# Patient Record
Sex: FEMALE | Race: WHITE
[De-identification: names, ages, dates, MRNs, and addresses within clinical notes are randomized per-mention and may not be internally consistent; named-entity substitution may affect disease eponyms.]

---

## 2019-02-02 ENCOUNTER — HOSPITAL ENCOUNTER (EMERGENCY)
Dept: HOSPITAL 12 - ER | Age: 69
Discharge: HOME | End: 2019-02-02
Payer: SELF-PAY

## 2019-02-02 VITALS — BODY MASS INDEX: 33.97 KG/M2 | HEIGHT: 64 IN | WEIGHT: 199 LBS

## 2019-02-02 VITALS — DIASTOLIC BLOOD PRESSURE: 82 MMHG | SYSTOLIC BLOOD PRESSURE: 147 MMHG

## 2019-02-02 DIAGNOSIS — Z79.2: ICD-10-CM

## 2019-02-02 DIAGNOSIS — J06.9: Primary | ICD-10-CM

## 2019-02-02 DIAGNOSIS — Z79.899: ICD-10-CM

## 2019-02-02 DIAGNOSIS — E11.9: ICD-10-CM

## 2019-02-02 DIAGNOSIS — I10: ICD-10-CM

## 2019-02-02 PROCEDURE — A4663 DIALYSIS BLOOD PRESSURE CUFF: HCPCS

## 2019-02-02 NOTE — NUR
Patient discharged to home in stable conditon.  Written and verbal after care 
instructions given. 

Patient verbalizes understanding of instructions.  Pt ambulated out of ER in 
steady gait with family. VSS. No acute distress noted.